# Patient Record
Sex: FEMALE | Race: WHITE | NOT HISPANIC OR LATINO | URBAN - METROPOLITAN AREA
[De-identification: names, ages, dates, MRNs, and addresses within clinical notes are randomized per-mention and may not be internally consistent; named-entity substitution may affect disease eponyms.]

---

## 2018-08-08 ENCOUNTER — INPATIENT (INPATIENT)
Facility: HOSPITAL | Age: 83
LOS: 1 days | Discharge: HOME | End: 2018-08-10
Attending: INTERNAL MEDICINE | Admitting: INTERNAL MEDICINE

## 2018-08-08 VITALS
DIASTOLIC BLOOD PRESSURE: 75 MMHG | SYSTOLIC BLOOD PRESSURE: 135 MMHG | OXYGEN SATURATION: 95 % | WEIGHT: 123.02 LBS | HEART RATE: 80 BPM | TEMPERATURE: 99 F | RESPIRATION RATE: 18 BRPM | HEIGHT: 61 IN

## 2018-08-08 DIAGNOSIS — K57.12 DIVERTICULITIS OF SMALL INTESTINE WITHOUT PERFORATION OR ABSCESS WITHOUT BLEEDING: ICD-10-CM

## 2018-08-08 DIAGNOSIS — G47.00 INSOMNIA, UNSPECIFIED: ICD-10-CM

## 2018-08-08 DIAGNOSIS — Z98.890 OTHER SPECIFIED POSTPROCEDURAL STATES: Chronic | ICD-10-CM

## 2018-08-08 DIAGNOSIS — F32.9 MAJOR DEPRESSIVE DISORDER, SINGLE EPISODE, UNSPECIFIED: ICD-10-CM

## 2018-08-08 LAB
ALBUMIN SERPL ELPH-MCNC: 3.8 G/DL — SIGNIFICANT CHANGE UP (ref 3.5–5.2)
ALP SERPL-CCNC: 84 U/L — SIGNIFICANT CHANGE UP (ref 30–115)
ALT FLD-CCNC: 7 U/L — SIGNIFICANT CHANGE UP (ref 0–41)
ANION GAP SERPL CALC-SCNC: 14 MMOL/L — SIGNIFICANT CHANGE UP (ref 7–14)
APPEARANCE UR: CLEAR — SIGNIFICANT CHANGE UP
AST SERPL-CCNC: 17 U/L — SIGNIFICANT CHANGE UP (ref 0–41)
BACTERIA # UR AUTO: ABNORMAL
BASOPHILS # BLD AUTO: 0.05 K/UL — SIGNIFICANT CHANGE UP (ref 0–0.2)
BASOPHILS NFR BLD AUTO: 0.3 % — SIGNIFICANT CHANGE UP (ref 0–1)
BILIRUB SERPL-MCNC: 0.8 MG/DL — SIGNIFICANT CHANGE UP (ref 0.2–1.2)
BILIRUB UR-MCNC: NEGATIVE — SIGNIFICANT CHANGE UP
BUN SERPL-MCNC: 27 MG/DL — HIGH (ref 10–20)
CALCIUM SERPL-MCNC: 9.2 MG/DL — SIGNIFICANT CHANGE UP (ref 8.5–10.1)
CHLORIDE SERPL-SCNC: 100 MMOL/L — SIGNIFICANT CHANGE UP (ref 98–110)
CO2 SERPL-SCNC: 26 MMOL/L — SIGNIFICANT CHANGE UP (ref 17–32)
COD CRY URNS QL: NEGATIVE — SIGNIFICANT CHANGE UP
COLOR SPEC: YELLOW — SIGNIFICANT CHANGE UP
CREAT SERPL-MCNC: 1 MG/DL — SIGNIFICANT CHANGE UP (ref 0.7–1.5)
DIFF PNL FLD: NEGATIVE — SIGNIFICANT CHANGE UP
EOSINOPHIL # BLD AUTO: 0.37 K/UL — SIGNIFICANT CHANGE UP (ref 0–0.7)
EOSINOPHIL NFR BLD AUTO: 2.3 % — SIGNIFICANT CHANGE UP (ref 0–8)
EPI CELLS # UR: ABNORMAL /HPF
GLUCOSE SERPL-MCNC: 127 MG/DL — HIGH (ref 70–99)
GLUCOSE UR QL: NEGATIVE MG/DL — SIGNIFICANT CHANGE UP
GRAN CASTS # UR COMP ASSIST: ABNORMAL /LPF
HCT VFR BLD CALC: 36 % — LOW (ref 37–47)
HGB BLD-MCNC: 12.2 G/DL — SIGNIFICANT CHANGE UP (ref 12–16)
HYALINE CASTS # UR AUTO: NEGATIVE — SIGNIFICANT CHANGE UP
IMM GRANULOCYTES NFR BLD AUTO: 0.4 % — HIGH (ref 0.1–0.3)
KETONES UR-MCNC: NEGATIVE — SIGNIFICANT CHANGE UP
LEUKOCYTE ESTERASE UR-ACNC: ABNORMAL
LYMPHOCYTES # BLD AUTO: 1.04 K/UL — LOW (ref 1.2–3.4)
LYMPHOCYTES # BLD AUTO: 6.4 % — LOW (ref 20.5–51.1)
MCHC RBC-ENTMCNC: 29.2 PG — SIGNIFICANT CHANGE UP (ref 27–31)
MCHC RBC-ENTMCNC: 33.9 G/DL — SIGNIFICANT CHANGE UP (ref 32–37)
MCV RBC AUTO: 86.1 FL — SIGNIFICANT CHANGE UP (ref 81–99)
MONOCYTES # BLD AUTO: 0.6 K/UL — SIGNIFICANT CHANGE UP (ref 0.1–0.6)
MONOCYTES NFR BLD AUTO: 3.7 % — SIGNIFICANT CHANGE UP (ref 1.7–9.3)
NEUTROPHILS # BLD AUTO: 14.12 K/UL — HIGH (ref 1.4–6.5)
NEUTROPHILS NFR BLD AUTO: 86.9 % — HIGH (ref 42.2–75.2)
NITRITE UR-MCNC: NEGATIVE — SIGNIFICANT CHANGE UP
NRBC # BLD: 0 /100 WBCS — SIGNIFICANT CHANGE UP (ref 0–0)
PH UR: 6 — SIGNIFICANT CHANGE UP (ref 5–8)
PLATELET # BLD AUTO: 200 K/UL — SIGNIFICANT CHANGE UP (ref 130–400)
POTASSIUM SERPL-MCNC: 4 MMOL/L — SIGNIFICANT CHANGE UP (ref 3.5–5)
POTASSIUM SERPL-SCNC: 4 MMOL/L — SIGNIFICANT CHANGE UP (ref 3.5–5)
PROT SERPL-MCNC: 6.4 G/DL — SIGNIFICANT CHANGE UP (ref 6–8)
PROT UR-MCNC: ABNORMAL MG/DL
RBC # BLD: 4.18 M/UL — LOW (ref 4.2–5.4)
RBC # FLD: 12.8 % — SIGNIFICANT CHANGE UP (ref 11.5–14.5)
RBC CASTS # UR COMP ASSIST: NEGATIVE — SIGNIFICANT CHANGE UP
SODIUM SERPL-SCNC: 140 MMOL/L — SIGNIFICANT CHANGE UP (ref 135–146)
SP GR SPEC: 1.02 — SIGNIFICANT CHANGE UP (ref 1.01–1.03)
TRI-PHOS CRY UR QL COMP ASSIST: NEGATIVE — SIGNIFICANT CHANGE UP
URATE CRY FLD QL MICRO: NEGATIVE — SIGNIFICANT CHANGE UP
UROBILINOGEN FLD QL: 1 MG/DL (ref 0.2–0.2)
WBC # BLD: 16.25 K/UL — HIGH (ref 4.8–10.8)
WBC # FLD AUTO: 16.25 K/UL — HIGH (ref 4.8–10.8)
WBC UR QL: SIGNIFICANT CHANGE UP /HPF

## 2018-08-08 RX ORDER — CIPROFLOXACIN LACTATE 400MG/40ML
400 VIAL (ML) INTRAVENOUS ONCE
Qty: 0 | Refills: 0 | Status: COMPLETED | OUTPATIENT
Start: 2018-08-08 | End: 2018-08-08

## 2018-08-08 RX ORDER — ASPIRIN/CALCIUM CARB/MAGNESIUM 324 MG
81 TABLET ORAL DAILY
Qty: 0 | Refills: 0 | Status: DISCONTINUED | OUTPATIENT
Start: 2018-08-08 | End: 2018-08-10

## 2018-08-08 RX ORDER — ACETAMINOPHEN 500 MG
650 TABLET ORAL EVERY 6 HOURS
Qty: 0 | Refills: 0 | Status: DISCONTINUED | OUTPATIENT
Start: 2018-08-08 | End: 2018-08-10

## 2018-08-08 RX ORDER — SERTRALINE 25 MG/1
50 TABLET, FILM COATED ORAL DAILY
Qty: 0 | Refills: 0 | Status: DISCONTINUED | OUTPATIENT
Start: 2018-08-08 | End: 2018-08-10

## 2018-08-08 RX ORDER — METRONIDAZOLE 500 MG
500 TABLET ORAL ONCE
Qty: 0 | Refills: 0 | Status: COMPLETED | OUTPATIENT
Start: 2018-08-08 | End: 2018-08-08

## 2018-08-08 RX ORDER — SODIUM CHLORIDE 9 MG/ML
500 INJECTION INTRAMUSCULAR; INTRAVENOUS; SUBCUTANEOUS ONCE
Qty: 0 | Refills: 0 | Status: COMPLETED | OUTPATIENT
Start: 2018-08-08 | End: 2018-08-08

## 2018-08-08 RX ORDER — TRAZODONE HCL 50 MG
50 TABLET ORAL AT BEDTIME
Qty: 0 | Refills: 0 | Status: DISCONTINUED | OUTPATIENT
Start: 2018-08-08 | End: 2018-08-10

## 2018-08-08 RX ORDER — HEPARIN SODIUM 5000 [USP'U]/ML
5000 INJECTION INTRAVENOUS; SUBCUTANEOUS EVERY 12 HOURS
Qty: 0 | Refills: 0 | Status: DISCONTINUED | OUTPATIENT
Start: 2018-08-08 | End: 2018-08-10

## 2018-08-08 RX ADMIN — Medication 100 MILLIGRAM(S): at 20:38

## 2018-08-08 RX ADMIN — Medication 200 MILLIGRAM(S): at 21:47

## 2018-08-08 RX ADMIN — SODIUM CHLORIDE 500 MILLILITER(S): 9 INJECTION INTRAMUSCULAR; INTRAVENOUS; SUBCUTANEOUS at 12:18

## 2018-08-08 RX ADMIN — SODIUM CHLORIDE 500 MILLILITER(S): 9 INJECTION INTRAMUSCULAR; INTRAVENOUS; SUBCUTANEOUS at 13:04

## 2018-08-08 RX ADMIN — Medication 400 MILLIGRAM(S): at 14:35

## 2018-08-08 RX ADMIN — Medication 200 MILLIGRAM(S): at 14:24

## 2018-08-08 NOTE — ED PROVIDER NOTE - MEDICAL DECISION MAKING DETAILS
89yF  recent abx for uti p/w abdominal pain -  CT with diverticulitis -   abx  given - ultrasound ordered for GB -  [facility] will follow results-

## 2018-08-08 NOTE — ED PROVIDER NOTE - NS ED ROS FT
Review of Systems:  	•	CONSTITUTIONAL - no fever, no diaphoresis, no chills  	•	SKIN - no rash  	•	HEMATOLOGIC - no bleeding, no bruising  	•  	•	RESPIRATORY - no shortness of breath, no cough  	•	CARDIAC - no chest pain, no palpitations  	  	•	MUSCULOSKELETAL - no joint paint, no swelling, no redness  	•	NEUROLOGIC - no weakness, no headache, no paresthesias, no LOC

## 2018-08-08 NOTE — ED ADULT NURSE NOTE - NSIMPLEMENTINTERV_GEN_ALL_ED
Implemented All Universal Safety Interventions:  [place] to call system. Call bell, personal items and telephone within reach. Instruct patient to call for assistance. Room bathroom lighting operational. Non-slip footwear when patient is off stretcher. Physically safe environment: no spills, clutter or unnecessary equipment. Stretcher in lowest position, wheels locked, appropriate side rails in place.

## 2018-08-08 NOTE — ED PROVIDER NOTE - PROGRESS NOTE DETAILS
I personally evaluated the patient. I reviewed the Resident’s or Physician Assistant’s note (as assigned above), and agree with the findings and plan except as documented in my note. 89yo F with a PMH of Depression comes in c/o UTI symptoms. PT was Rx for Nitrofurantoin on Monday at [facility] and has been vomiting and not eating since. Exam: WA, NAD, AAO X3, PROVIDING APPROPRIATE HISTORY, NCAT, PERRLA 3MM BILATERAL, NO NYSTAGMUS, EOMI, HEENT NORMAL, SPEAKING IN FULL SENTNCES, S1S2, NO MRG, RRR, CTABL, NO WRR, ABD SOFT, NT, ND, NO REBOUND, NO PULSATILE ABDOMINAL MASS, NO GAURDING, NO SIGNS OF PERITINITIS, NO CVA TENDERNESS, NO LEG EDEMA, + SYMMETRIC PULSES BILATERAL, NO RASH, NEURO EXAM UNREMARKABLE, CN 2-12 GROSSLY INTACT. Plan: labs, imaging reassess

## 2018-08-08 NOTE — ED PROVIDER NOTE - PHYSICAL EXAMINATION
--EXAM--  VITAL SIGNS: I have reviewed vs documented at present.  CONSTITUTIONAL: Well-developed; well-nourished; in no acute distress.   SKIN: Warm and dry, no acute rash.   HEAD: Normocephalic; atraumatic.    NECK: Supple; non tender.  CARD: S1, S2, Regular rate and rhythm.   RESP: No wheezes, rales or rhonchi.  ABD: Normal bowel sounds; soft; non-distended; non-tender.  EXT: Normal ROM.   NEURO: Alert, oriented, grossly unremarkable. Strength 5/5 in all extremities. Sensation intact throughout.  PSYCH: Cooperative, appropriate.

## 2018-08-08 NOTE — ED ADULT TRIAGE NOTE - CHIEF COMPLAINT QUOTE
As per patient daughter "She has a UTI and started taking an antibiotic on Monday and had two doses (nitrofurantoin MONO- mg 1 cap by mouth twice a day). Yesterday started vomiting, feeling weak, and dizzy. We stopped the antibiotic because we think she has an allergy to it".

## 2018-08-08 NOTE — H&P ADULT - ATTENDING COMMENTS
[age]yo female recently started on macrobid for a uti but had some side effects after taking the second dose was sent to the ER due to confusion and weakness. Actually found to have diverticulitis, amongst other findings including a liver lesion and cholelithiasis in a distended gall bladder, on CAT scan. Currently resting comfortably in bed. PHYSICAL wd,wn female in nad, pleasant and answering simple questions LUNGS-cta CV-regular ABDOMEN-non tender on light palpation ASSESSMENT- Diverticulitis of Large Bowel PLAN-for now continue iv antibiotics started in the er (cipro and flagyl) with iv fluids and clear liquids diet. Need to clarify with family further interventions such as surgical intervention for cholelithiasis or MRI of liver (see CT scan results)

## 2018-08-08 NOTE — H&P ADULT - NSHPLABSRESULTS_GEN_ALL_CORE
12.2   16.25 )-----------( 200      ( 08 Aug 2018 12:25 )             36.0       08-    140  |  100  |  27<H>  ----------------------------<  127<H>  4.0   |  26  |  1.0    Ca    9.2      08 Aug 2018 12:25    TPro  6.4  /  Alb  3.8  /  TBili  0.8  /  DBili  x   /  AST  17  /  ALT  7   /  AlkPhos  84  -              Urinalysis Basic - ( 08 Aug 2018 16:00 )    Color: Yellow / Appearance: Clear / S.020 / pH: x  Gluc: x / Ketone: Negative  / Bili: Negative / Urobili: 1.0 mg/dL   Blood: x / Protein: Trace mg/dL / Nitrite: Negative   Leuk Esterase: Trace / RBC: Negative / WBC 3-5 /HPF   Sq Epi: x / Non Sq Epi: Few /HPF / Bacteria: Few            Lactate Trend            CAPILLARY BLOOD GLUCOSE          ct  shows retosigmoid diverticulitis

## 2018-08-08 NOTE — H&P ADULT - NSHPPHYSICALEXAM_GEN_ALL_CORE
GENERAL:  89y/o Female NAD, resting comfortably.  HEAD:  Atraumatic, Normocephalic  EYES: EOMI, PERRLA, conjunctiva and sclera clear  NECK: Supple, No JVD, no cervical lymphadenopathy, non-tender  CHEST/LUNG: Clear to auscultation bilaterally; No wheeze, rhonchi, or rales  HEART: Regular rate and rhythm; S1&S2  ABDOMEN: Soft, Nontender, Nondistended x 4 quadrants; Bowel sounds present  EXTREMITIES:   Peripheral Pulses Present, No clubbing, no cyanosis, or no edema, no calf tenderness  PSYCH: AAOx3, cooperative, appropriate  NEUROLOGY: WNL  SKIN: WNL

## 2018-08-08 NOTE — ED PROVIDER NOTE - OBJECTIVE STATEMENT
This is 89 yo female who presents to ed for evaluation. patient is visiting her daughter. patient started to become confused and weak. daugther states mom has history of multiple UTI. patient was taken to urgent care and she was prescribed macrobid for UTI. as per daughter she became sick after second dose . daughter called her brother and he states that she has allergy to macrobid.   patient denies any nausea or vomiting. no abdominal pain.   since patient vomited last night daughter taught she might be dehydrated. This is 89 yo female who presents to ed for evaluation. patient is visiting her daughter. patient started to become confused and weak. daughter states mom has history of multiple UTI. patient was taken to urgent care and she was prescribed macrobid for UTI. as per daughter she became sick after second dose . daughter called her brother and he states that she has allergy to macrobid.   patient denies any nausea or vomiting. no abdominal pain.   since patient vomited last night daughter taught she might be dehydrated.

## 2018-08-08 NOTE — H&P ADULT - HISTORY OF PRESENT ILLNESS
· HPI Objective Statement: This is 89 yo female who presents to ed for evaluation. patient is visiting her daughter. patient started to become confused and weak. daugther states mom has history of multiple UTI. patient was taken to urgent care and she was prescribed macrobid for UTI. as per daughter she became sick after second dose . daughter called her brother and he states that she has allergy to macrobid.   patient denies any nausea or vomiting. no abdominal pain.   since patient vomited last night daughter taught she might be dehydrated.	  · Negative Findings: no dizziness, no numbness, no tingling

## 2018-08-09 LAB
ALBUMIN SERPL ELPH-MCNC: 3.4 G/DL — LOW (ref 3.5–5.2)
ALP SERPL-CCNC: 81 U/L — SIGNIFICANT CHANGE UP (ref 30–115)
ALT FLD-CCNC: 8 U/L — SIGNIFICANT CHANGE UP (ref 0–41)
ANION GAP SERPL CALC-SCNC: 12 MMOL/L — SIGNIFICANT CHANGE UP (ref 7–14)
AST SERPL-CCNC: 13 U/L — SIGNIFICANT CHANGE UP (ref 0–41)
BILIRUB SERPL-MCNC: 0.5 MG/DL — SIGNIFICANT CHANGE UP (ref 0.2–1.2)
BUN SERPL-MCNC: 20 MG/DL — SIGNIFICANT CHANGE UP (ref 10–20)
CALCIUM SERPL-MCNC: 8.7 MG/DL — SIGNIFICANT CHANGE UP (ref 8.5–10.1)
CHLORIDE SERPL-SCNC: 104 MMOL/L — SIGNIFICANT CHANGE UP (ref 98–110)
CO2 SERPL-SCNC: 25 MMOL/L — SIGNIFICANT CHANGE UP (ref 17–32)
CREAT SERPL-MCNC: 0.9 MG/DL — SIGNIFICANT CHANGE UP (ref 0.7–1.5)
GLUCOSE SERPL-MCNC: 89 MG/DL — SIGNIFICANT CHANGE UP (ref 70–99)
HCT VFR BLD CALC: 33.8 % — LOW (ref 37–47)
HGB BLD-MCNC: 11.4 G/DL — LOW (ref 12–16)
MCHC RBC-ENTMCNC: 29.1 PG — SIGNIFICANT CHANGE UP (ref 27–31)
MCHC RBC-ENTMCNC: 33.7 G/DL — SIGNIFICANT CHANGE UP (ref 32–37)
MCV RBC AUTO: 86.2 FL — SIGNIFICANT CHANGE UP (ref 81–99)
NRBC # BLD: 0 /100 WBCS — SIGNIFICANT CHANGE UP (ref 0–0)
PLATELET # BLD AUTO: 182 K/UL — SIGNIFICANT CHANGE UP (ref 130–400)
POTASSIUM SERPL-MCNC: 4 MMOL/L — SIGNIFICANT CHANGE UP (ref 3.5–5)
POTASSIUM SERPL-SCNC: 4 MMOL/L — SIGNIFICANT CHANGE UP (ref 3.5–5)
PROT SERPL-MCNC: 5.8 G/DL — LOW (ref 6–8)
RBC # BLD: 3.92 M/UL — LOW (ref 4.2–5.4)
RBC # FLD: 12.7 % — SIGNIFICANT CHANGE UP (ref 11.5–14.5)
SODIUM SERPL-SCNC: 141 MMOL/L — SIGNIFICANT CHANGE UP (ref 135–146)
WBC # BLD: 8.31 K/UL — SIGNIFICANT CHANGE UP (ref 4.8–10.8)
WBC # FLD AUTO: 8.31 K/UL — SIGNIFICANT CHANGE UP (ref 4.8–10.8)

## 2018-08-09 RX ORDER — CIPROFLOXACIN LACTATE 400MG/40ML
400 VIAL (ML) INTRAVENOUS EVERY 12 HOURS
Qty: 0 | Refills: 0 | Status: DISCONTINUED | OUTPATIENT
Start: 2018-08-09 | End: 2018-08-10

## 2018-08-09 RX ORDER — ASPIRIN/CALCIUM CARB/MAGNESIUM 324 MG
1 TABLET ORAL
Qty: 0 | Refills: 0 | COMMUNITY

## 2018-08-09 RX ORDER — SODIUM CHLORIDE 9 MG/ML
1000 INJECTION INTRAMUSCULAR; INTRAVENOUS; SUBCUTANEOUS
Qty: 0 | Refills: 0 | Status: DISCONTINUED | OUTPATIENT
Start: 2018-08-09 | End: 2018-08-09

## 2018-08-09 RX ORDER — METRONIDAZOLE 500 MG
500 TABLET ORAL EVERY 8 HOURS
Qty: 0 | Refills: 0 | Status: DISCONTINUED | OUTPATIENT
Start: 2018-08-09 | End: 2018-08-10

## 2018-08-09 RX ORDER — SERTRALINE 25 MG/1
1 TABLET, FILM COATED ORAL
Qty: 0 | Refills: 0 | COMMUNITY

## 2018-08-09 RX ORDER — TRAZODONE HCL 50 MG
0 TABLET ORAL
Qty: 0 | Refills: 0 | COMMUNITY

## 2018-08-09 RX ADMIN — Medication 81 MILLIGRAM(S): at 11:31

## 2018-08-09 RX ADMIN — Medication 100 MILLIGRAM(S): at 06:33

## 2018-08-09 RX ADMIN — Medication 100 MILLIGRAM(S): at 21:31

## 2018-08-09 RX ADMIN — Medication 50 MILLIGRAM(S): at 21:31

## 2018-08-09 RX ADMIN — Medication 200 MILLIGRAM(S): at 17:25

## 2018-08-09 RX ADMIN — Medication 200 MILLIGRAM(S): at 06:33

## 2018-08-09 RX ADMIN — HEPARIN SODIUM 5000 UNIT(S): 5000 INJECTION INTRAVENOUS; SUBCUTANEOUS at 17:25

## 2018-08-09 RX ADMIN — Medication 100 MILLIGRAM(S): at 13:39

## 2018-08-09 RX ADMIN — SODIUM CHLORIDE 50 MILLILITER(S): 9 INJECTION INTRAMUSCULAR; INTRAVENOUS; SUBCUTANEOUS at 05:51

## 2018-08-09 RX ADMIN — HEPARIN SODIUM 5000 UNIT(S): 5000 INJECTION INTRAVENOUS; SUBCUTANEOUS at 05:51

## 2018-08-09 RX ADMIN — SERTRALINE 50 MILLIGRAM(S): 25 TABLET, FILM COATED ORAL at 11:31

## 2018-08-09 NOTE — PROGRESS NOTE ADULT - ASSESSMENT
This is 89 yo female was brought to ED for confusion and vomiting. She was seen in Urgent care and given Macrobid for UTI but she felt weak after two pills, family thinks she might had allergic reaction to it.   In the ED her vital signs were stable, labs showed leukocytosis 16K, UA unremarkable, Abdomen CT showed acute diverticulitis.     A/P:   1. Metabolic Encephalopathy    2. Acute Diverticulitis:     3. Insomnia:   Continue Trazadone    4. Depression:   Continue Sertraline     5. Right hepatic lobe lesion :   3cm not well characterized on CT, recommended MRI, will discuss with Family.       DVT PPX: Heparin SC. This is 89 yo female was brought to ED for confusion and vomiting. She was seen in Urgent care and given Macrobid for UTI but she felt weak after two pills, family thinks she might had allergic reaction to it.   In the ED her vital signs were stable, labs showed leukocytosis 16K, UA unremarkable, Abdomen CT showed acute diverticulitis.     A/P:   1. Acute Diverticulitis:   Improved, Continue with Cipro and Flagyl  Continue IV fluid, NS, advance diet as tolerated.     2. Insomnia:   Continue Trazadone    3. Depression:   Continue Sertraline     4. Right hepatic lobe lesion :   3cm not well characterized on CT, recommended MRI, advised to follow up with her PCP in [place].       DVT PPX: Heparin SC.   Disposition discharge home tomorrow.

## 2018-08-09 NOTE — PHYSICAL THERAPY INITIAL EVALUATION ADULT - IMPAIRMENTS FOUND, PT EVAL
aerobic capacity/endurance/gait, locomotion, and balance/muscle strength/ergonomics and body mechanics/posture

## 2018-08-09 NOTE — PROGRESS NOTE ADULT - SUBJECTIVE AND OBJECTIVE BOX
UDAY, BARBARA  89y  Female      Patient is a 89y old  Female who presents with a chief complaint of vomiting (08 Aug 2018 20:37)      INTERVAL HPI/OVERNIGHT EVENTS:    Vital Signs Last 24 Hrs  T(C): 36.4 (09 Aug 2018 05:19), Max: 37.1 (08 Aug 2018 11:37)  T(F): 97.5 (09 Aug 2018 05:19), Max: 98.7 (08 Aug 2018 11:37)  HR: 67 (09 Aug 2018 05:19) (66 - 80)  BP: 152/82 (09 Aug 2018 05:19) (135/75 - 179/84)  BP(mean): --  RR: 16 (09 Aug 2018 05:19) (16 - 20)  SpO2: 96% (08 Aug 2018 19:26) (95% - 97%)            Consultant(s) Notes Reviewed:  [x ] YES  [ ] NO          MEDICATIONS  (STANDING):  aspirin  chewable 81 milliGRAM(s) Oral daily  ciprofloxacin   IVPB 400 milliGRAM(s) IV Intermittent every 12 hours  heparin  Injectable 5000 Unit(s) SubCutaneous every 12 hours  metroNIDAZOLE  IVPB 500 milliGRAM(s) IV Intermittent every 8 hours  sertraline 50 milliGRAM(s) Oral daily  sodium chloride 0.9%. 1000 milliLiter(s) (50 mL/Hr) IV Continuous <Continuous>  traZODone 50 milliGRAM(s) Oral at bedtime    MEDICATIONS  (PRN):  acetaminophen   Tablet 650 milliGRAM(s) Oral every 6 hours PRN For Temp greater than 38 C (100.4 F)or mild pain, or ha      LABS                          12.2   16.25 )-----------( 200      ( 08 Aug 2018 12:25 )             36.0     08-08    140  |  100  |  27<H>  ----------------------------<  127<H>  4.0   |  26  |  1.0    Ca    9.2      08 Aug 2018 12:25    TPro  6.4  /  Alb  3.8  /  TBili  0.8  /  DBili  x   /  AST  17  /  ALT  7   /  AlkPhos  84  08-08      Urinalysis Basic - ( 08 Aug 2018 16:00 )    Color: Yellow / Appearance: Clear / S.020 / pH: x  Gluc: x / Ketone: Negative  / Bili: Negative / Urobili: 1.0 mg/dL   Blood: x / Protein: Trace mg/dL / Nitrite: Negative   Leuk Esterase: Trace / RBC: Negative / WBC 3-5 /HPF   Sq Epi: x / Non Sq Epi: Few /HPF / Bacteria: Few        Lactate Trend        CAPILLARY BLOOD GLUCOSE            RADIOLOGY & ADDITIONAL TESTS:    Imaging Personally Reviewed:  [ ] YES  [ ] NO    HEALTH ISSUES - PROBLEM Dx:  Depression: Depression  Insomnia: Insomnia  Diverticulitis of small bowel: Diverticulitis of small bowel    PHYSICAL EXAM:  GENERAL: NAD, well-developed  HEAD:  Atraumatic, Normocephalic  EYES: EOMI, PERRLA, conjunctiva and sclera clear  NECK: Supple, No JVD  CHEST/LUNG: Clear to auscultation bilaterally; No wheeze  HEART: Regular rate and rhythm; No murmurs, rubs, or gallops  ABDOMEN: Soft, Nontender, Nondistended; Bowel sounds present  EXTREMITIES:  2+ Peripheral Pulses, No clubbing, cyanosis, or edema  PSYCH: AAOx3  NEUROLOGY: non-focal  SKIN: No rashes or lesions

## 2018-08-09 NOTE — PHYSICAL THERAPY INITIAL EVALUATION ADULT - GENERAL OBSERVATIONS, REHAB EVAL
10:00 - 10:20. Chart reviewed. Patient available to be seen for physical therapy, confirmed with nurse. Patient encountered semi-reclined in bed, +IV. Patient denies pain at rest, would like to walk with PT now.

## 2018-08-10 VITALS
RESPIRATION RATE: 16 BRPM | SYSTOLIC BLOOD PRESSURE: 168 MMHG | DIASTOLIC BLOOD PRESSURE: 80 MMHG | TEMPERATURE: 97 F | HEART RATE: 70 BPM

## 2018-08-10 LAB
ANION GAP SERPL CALC-SCNC: 12 MMOL/L — SIGNIFICANT CHANGE UP (ref 7–14)
BUN SERPL-MCNC: 12 MG/DL — SIGNIFICANT CHANGE UP (ref 10–20)
CALCIUM SERPL-MCNC: 8.5 MG/DL — SIGNIFICANT CHANGE UP (ref 8.5–10.1)
CHLORIDE SERPL-SCNC: 106 MMOL/L — SIGNIFICANT CHANGE UP (ref 98–110)
CO2 SERPL-SCNC: 23 MMOL/L — SIGNIFICANT CHANGE UP (ref 17–32)
CREAT SERPL-MCNC: 0.8 MG/DL — SIGNIFICANT CHANGE UP (ref 0.7–1.5)
CULTURE RESULTS: NO GROWTH — SIGNIFICANT CHANGE UP
GLUCOSE SERPL-MCNC: 111 MG/DL — HIGH (ref 70–99)
POTASSIUM SERPL-MCNC: 3.5 MMOL/L — SIGNIFICANT CHANGE UP (ref 3.5–5)
POTASSIUM SERPL-SCNC: 3.5 MMOL/L — SIGNIFICANT CHANGE UP (ref 3.5–5)
SODIUM SERPL-SCNC: 141 MMOL/L — SIGNIFICANT CHANGE UP (ref 135–146)
SPECIMEN SOURCE: SIGNIFICANT CHANGE UP

## 2018-08-10 RX ORDER — MOXIFLOXACIN HYDROCHLORIDE TABLETS, 400 MG 400 MG/1
1 TABLET, FILM COATED ORAL
Qty: 14 | Refills: 0 | OUTPATIENT
Start: 2018-08-10 | End: 2018-08-16

## 2018-08-10 RX ORDER — METRONIDAZOLE 500 MG
1 TABLET ORAL
Qty: 14 | Refills: 0 | OUTPATIENT
Start: 2018-08-10 | End: 2018-08-16

## 2018-08-10 RX ORDER — LOPERAMIDE HCL 2 MG
1 TABLET ORAL
Qty: 30 | Refills: 0 | OUTPATIENT
Start: 2018-08-10

## 2018-08-10 RX ADMIN — Medication 200 MILLIGRAM(S): at 05:56

## 2018-08-10 RX ADMIN — Medication 100 MILLIGRAM(S): at 05:56

## 2018-08-10 RX ADMIN — HEPARIN SODIUM 5000 UNIT(S): 5000 INJECTION INTRAVENOUS; SUBCUTANEOUS at 05:57

## 2018-08-10 NOTE — DISCHARGE NOTE ADULT - HOSPITAL COURSE
This is 89 yo female was brought to ED for confusion and vomiting. She was seen in Urgent care and given Macrobid for UTI but she felt weak after two pills, family thinks she might had allergic reaction to it.   In the ED her vital signs were stable, labs showed leukocytosis 16K, UA unremarkable, Abdomen CT showed acute diverticulitis. she was admitted to the hospital and started on IV Cipro and Flagyl, IV fluid, clear liquid diet, symptoms improved. Leukocytosis resolved. Her diet advanced with good toleration. Patient will be discharged home on oral antibiotics.     A/P:   1. Acute Diverticulitis:   Improved, Continue with Cipro 500mg BID and Flagyl 500mg BID for 7 days.   low fiber diet for two weeks.   GI follow up outpatient. Possible Colonoscopy     2. Insomnia:   Continue Trazadone    3. Depression:   Continue Sertraline     4. Right hepatic lobe lesion :   3cm not well characterized on CT, recommended MRI, advised to follow up with her PCP in [place].

## 2018-08-10 NOTE — DISCHARGE NOTE ADULT - ADDITIONAL INSTRUCTIONS
Follow up with PCP in [place], [place] for hepatic lesion, she needs MRI for better information about this lesion.

## 2018-08-10 NOTE — DISCHARGE NOTE ADULT - MEDICATION SUMMARY - MEDICATIONS TO TAKE
I will START or STAY ON the medications listed below when I get home from the hospital:    Flagyl 500 mg oral tablet  -- 1 tab(s) by mouth 2 times a day   -- Do not drink alcoholic beverages when taking this medication.  Finish all this medication unless otherwise directed by prescriber.  May discolor urine or feces.    -- Indication: For Diverticulitis of small bowel    aspirin 81 mg oral tablet  -- 1 tab(s) by mouth once a day  -- Indication: For cardiac prophylactic    sertraline 50 mg oral tablet  -- 1 tab(s) by mouth once a day  -- Indication: For Depression    traZODone 50 mg oral tablet  -- orally once a day (at bedtime)  -- Indication: For Insomnia    Diamode 2 mg oral tablet  -- 1 tab(s) by mouth 4 times a day as needed for diarrhea  -- It is very important that you take or use this exactly as directed.  Do not skip doses or discontinue unless directed by your doctor.  May cause drowsiness.  Alcohol may intensify this effect.  Use care when operating dangerous machinery.  Obtain medical advice before taking any non-prescription drugs as some may affect the action of this medication.    -- Indication: For DIarrhea    Cipro 500 mg oral tablet  -- 1 tab(s) by mouth 2 times a day   -- Avoid prolonged or excessive exposure to direct and/or artificial sunlight while taking this medication.  Check with your doctor before becoming pregnant.  Do not take dairy products, antacids, or iron preparations within one hour of this medication.  Finish all this medication unless otherwise directed by prescriber.  Medication should be taken with plenty of water.    -- Indication: For DIVERTICULITIS

## 2018-08-10 NOTE — DISCHARGE NOTE ADULT - CARE PLAN
Principal Discharge DX:	Diverticulitis  Goal:	improved  Assessment and plan of treatment:	Continue Cipro and Flagyl for 7 days.   Imodium prn for diarrhea  Secondary Diagnosis:	Depression  Assessment and plan of treatment:	Continue Sertraline

## 2018-08-10 NOTE — DISCHARGE NOTE ADULT - PATIENT PORTAL LINK FT
You can access the [facility] Patient Portal, offered by [facility], by registering with the following website: [email or web address]

## 2018-08-15 DIAGNOSIS — F32.9 MAJOR DEPRESSIVE DISORDER, SINGLE EPISODE, UNSPECIFIED: ICD-10-CM

## 2018-08-15 DIAGNOSIS — R53.1 WEAKNESS: ICD-10-CM

## 2018-08-15 DIAGNOSIS — Z79.82 LONG TERM (CURRENT) USE OF ASPIRIN: ICD-10-CM

## 2018-08-15 DIAGNOSIS — K80.20 CALCULUS OF GALLBLADDER WITHOUT CHOLECYSTITIS WITHOUT OBSTRUCTION: ICD-10-CM

## 2018-08-15 DIAGNOSIS — K57.30 DIVERTICULOSIS OF LARGE INTESTINE WITHOUT PERFORATION OR ABSCESS WITHOUT BLEEDING: ICD-10-CM

## 2018-08-15 DIAGNOSIS — Z87.440 PERSONAL HISTORY OF URINARY (TRACT) INFECTIONS: ICD-10-CM

## 2018-08-15 DIAGNOSIS — K76.9 LIVER DISEASE, UNSPECIFIED: ICD-10-CM

## 2018-08-15 DIAGNOSIS — G47.00 INSOMNIA, UNSPECIFIED: ICD-10-CM

## 2019-04-12 NOTE — H&P ADULT - PROBLEM/PLAN-3
"Call to pt to notify per [facility]:     \"Please notify patient of results from her recent lab evaluation.  Her magnesium was slightly low at 1.8.  Given that she has had low magnesium levels in the past and today's finding, I would like her to start a low-dose magnesium supplement 400 mg magnesium oxide once daily.  Her potassium and kidney function have remained stable.   Thanks,   Martina Roberto\"     Pt verbalizes understanding and is agreeable to plan. Rx sent to [facility] per pt request. Cristina Boyd RN on 4/12/2019 at 10:49 AM   " DISPLAY PLAN FREE TEXT

## 2020-01-21 NOTE — ED ADULT NURSE NOTE - NS ED NOTE ABUSE SUSPICION NEGLECT YN
[PERRL With Normal Accommodation] : pupils were equal in size, round, and reactive to light [Sclera] : the sclera and conjunctiva were normal [Oriented To Time, Place, And Person] : oriented to person, place, and time [Extraocular Movements] : extraocular movements were intact [Impaired Insight] : insight and judgment were intact [Affect] : the affect was normal No

## 2022-06-20 NOTE — DISCHARGE NOTE ADULT - VISION (WITH CORRECTIVE LENSES IF THE PATIENT USUALLY WEARS THEM):
Normal vision: sees adequately in most situations; can see medication labels, newsprint Detail Level: Simple

## 2022-07-01 NOTE — PHYSICAL THERAPY INITIAL EVALUATION ADULT - BED MOBILITY LIMITATIONS, REHAB EVAL
impaired ability to control trunk for mobility/decreased ability to use legs for bridging/pushing/decreased ability to use arms for pushing/pulling
Self

## 2025-06-05 NOTE — H&P ADULT - NSHPREVIEWOFSYSTEMS_GEN_ALL_CORE
Goal Outcome Evaluation:                 Patient alert and oriented x4. Discharging today to [facility] by personal vehicle. Blood pressure stable after medication administration. Severe pain being treated with prn medication. Pulses unable to be palpated on lower extremities.                             REVIEW OF SYSTEMS:    CONSTITUTIONAL: No weakness, fevers or chills  EYES/ENT: No visual changes;  No vertigo or throat pain   NECK: No pain or stiffness  RESPIRATORY: No cough, wheezing, hemoptysis; No shortness of breath  CARDIOVASCULAR: No chest pain or palpitations  GASTROINTESTINAL: No abdominal or epigastric pain. No nausea, vomiting, or hematemesis; No diarrhea or constipation. No melena or hematochezia.  GENITOURINARY: No dysuria, frequency or hematuria  NEUROLOGICAL: No numbness or weakness  SKIN: No itching, rashes